# Patient Record
Sex: MALE | Race: WHITE | Employment: OTHER | URBAN - METROPOLITAN AREA
[De-identification: names, ages, dates, MRNs, and addresses within clinical notes are randomized per-mention and may not be internally consistent; named-entity substitution may affect disease eponyms.]

---

## 2017-09-27 ENCOUNTER — ALLIED HEALTH/NURSE VISIT (OUTPATIENT)
Dept: NURSING | Facility: CLINIC | Age: 32
End: 2017-09-27

## 2017-09-27 DIAGNOSIS — Z23 NEED FOR INFLUENZA VACCINATION: Primary | ICD-10-CM

## 2017-09-27 PROCEDURE — 90686 IIV4 VACC NO PRSV 0.5 ML IM: CPT

## 2017-09-27 PROCEDURE — 99207 ZZC NO CHARGE NURSE ONLY: CPT

## 2017-09-27 PROCEDURE — 90471 IMMUNIZATION ADMIN: CPT

## 2017-09-27 NOTE — NURSING NOTE
Screening Questionnaire for Adult Immunization    Are you sick today?   No   Do you have allergies to medications, food, a vaccine component or latex?   No   Have you ever had a serious reaction after receiving a vaccination?   No   Do you have a long-term health problem with heart disease, lung disease, asthma, kidney disease, metabolic disease (e.g. diabetes), anemia, or other blood disorder?   No   Do you have cancer, leukemia, HIV/AIDS, or any other immune system problem?   No   In the past 3 months, have you taken medications that affect  your immune system, such as prednisone, other steroids, or anticancer drugs; drugs for the treatment of rheumatoid arthritis, Crohn s disease, or psoriasis; or have you had radiation treatments?   No   Have you had a seizure, or a brain or other nervous system problem?   No   During the past year, have you received a transfusion of blood or blood     products, or been given immune (gamma) globulin or antiviral drug?   No   For women: Are you pregnant or is there a chance you could become        pregnant during the next month?   No   Have you received any vaccinations in the past 4 weeks?   No     Immunization questionnaire answers were all negative.        Per orders of Dr. Reed, injection of influenza given by Titi Hart. Patient instructed to remain in clinic for 15 minutes afterwards, and to report any adverse reaction to me immediately.       Screening performed by Titi Hart on 9/27/2017 at 12:06 PM.

## 2017-09-27 NOTE — MR AVS SNAPSHOT
"              After Visit Summary   9/27/2017    Jose Trammell    MRN: [id number]           Patient Information     Date Of Birth          1985        Visit Information        Provider Department      9/27/2017 1:15 PM UP NURSE [facility]        Today's Diagnoses     Need for influenza vaccination    -  1       Follow-ups after your visit        Your next 10 appointments already scheduled     Sep 27, 2017  1:15 PM CDT   Nurse Only with UP NURSE   [facility] ([facility])    [address]  [facility] [address] [phone]              Who to contact     If you have questions or need follow up information about today's clinic visit or your schedule please contact [facility] directly at [phone].  Normal or non-critical lab and imaging results will be communicated to you by MyChart, letter or phone within 4 business days after the clinic has received the results. If you do not hear from us within 7 days, please contact the clinic through MyChart or phone. If you have a critical or abnormal lab result, we will notify you by phone as soon as possible.  Submit refill requests through [employer] or call your pharmacy and they will forward the refill request to us. Please allow 3 business days for your refill to be completed.          Additional Information About Your Visit        MyChart Information     [employer] lets you send messages to your doctor, view your test results, renew your prescriptions, schedule appointments and more. To sign up, go to [email or web address] . Click on \"Log in\" on the left side of the screen, which will take you to the Welcome page. Then click on \"Sign up Now\" on the right side of the page.     You will be asked to enter the access code listed below, as well as some personal information. Please follow the directions to create your username and password.     Your access code is: [email or web address]  Expires: 12/26/2017 12:08 PM   "   Your access code will  in 90 days. If you need help or a new code, please call your [facility] clinic or [phone].        Care EveryWhere ID     This is your Care EveryWhere ID. This could be used by other organizations to access your [facility] medical records  [id number]         Blood Pressure from Last 3 Encounters:   16 112/60    Weight from Last 3 Encounters:   16 150 lb 11.2 oz (68.4 kg)              We Performed the Following     HC FLU VAC PRESRV FREE QUAD SPLIT VIR 3+YRS IM        Primary Care Provider    None Specified       No primary provider on file.        Equal Access to Services     [facility]: Hadii aad doe hadmariel Wick, magida kamila, romero kwonmabere chamberlain, michelle henson . So [facility] [phone].    ATENCIÓN: Si habla español, tiene a gomez disposición servicios gratuitos de asistencia lingüística. Llame al [phone].    We comply with applicable federal civil rights laws and [place] laws. We do not discriminate on the basis of race, color, national origin, age, disability sex, sexual orientation or gender identity.            Thank you!     Thank you for choosing [facility] N[facility]  for your care. Our goal is always to provide you with excellent care. Hearing back from our patients is one way we can continue to improve our services. Please take a few minutes to complete the written survey that you may receive in the mail after your visit with us. Thank you!             Your Updated Medication List - Protect others around you: Learn how to safely use, store and throw away your medicines at www.disposemymeds.org.      Notice  As of 2017 12:08 PM    You have not been prescribed any medications.

## 2020-08-04 ENCOUNTER — OFFICE VISIT (OUTPATIENT)
Dept: URGENT CARE | Facility: URGENT CARE | Age: 35
End: 2020-08-04
Payer: COMMERCIAL

## 2020-08-04 VITALS
HEART RATE: 88 BPM | TEMPERATURE: 98.6 F | OXYGEN SATURATION: 96 % | SYSTOLIC BLOOD PRESSURE: 118 MMHG | DIASTOLIC BLOOD PRESSURE: 80 MMHG | BODY MASS INDEX: 23.22 KG/M2 | WEIGHT: 157.7 LBS

## 2020-08-04 DIAGNOSIS — I49.9 IRREGULAR HEARTBEAT: Primary | ICD-10-CM

## 2020-08-04 DIAGNOSIS — Z20.828 EXPOSURE TO SARS-ASSOCIATED CORONAVIRUS: ICD-10-CM

## 2020-08-04 DIAGNOSIS — R00.2 PALPITATIONS: ICD-10-CM

## 2020-08-04 LAB
ANION GAP SERPL CALCULATED.3IONS-SCNC: 6 MMOL/L (ref 3–14)
BUN SERPL-MCNC: 12 MG/DL (ref 7–30)
CALCIUM SERPL-MCNC: 8.9 MG/DL (ref 8.5–10.1)
CHLORIDE SERPL-SCNC: 106 MMOL/L (ref 94–109)
CO2 SERPL-SCNC: 25 MMOL/L (ref 20–32)
CREAT SERPL-MCNC: 0.81 MG/DL (ref 0.66–1.25)
ERYTHROCYTE [DISTWIDTH] IN BLOOD BY AUTOMATED COUNT: 11.6 % (ref 10–15)
GFR SERPL CREATININE-BSD FRML MDRD: >90 ML/MIN/{1.73_M2}
GLUCOSE SERPL-MCNC: 97 MG/DL (ref 70–99)
HCT VFR BLD AUTO: 45.3 % (ref 40–53)
HGB BLD-MCNC: 15.7 G/DL (ref 13.3–17.7)
MCH RBC QN AUTO: 31.7 PG (ref 26.5–33)
MCHC RBC AUTO-ENTMCNC: 34.7 G/DL (ref 31.5–36.5)
MCV RBC AUTO: 91 FL (ref 78–100)
PLATELET # BLD AUTO: 266 10E9/L (ref 150–450)
POTASSIUM SERPL-SCNC: 4 MMOL/L (ref 3.4–5.3)
RBC # BLD AUTO: 4.96 10E12/L (ref 4.4–5.9)
SODIUM SERPL-SCNC: 137 MMOL/L (ref 133–144)
TSH SERPL DL<=0.005 MIU/L-ACNC: 1.16 MU/L (ref 0.4–4)
WBC # BLD AUTO: 10.4 10E9/L (ref 4–11)

## 2020-08-04 PROCEDURE — 99203 OFFICE O/P NEW LOW 30 MIN: CPT | Performed by: NURSE PRACTITIONER

## 2020-08-04 PROCEDURE — 99000 SPECIMEN HANDLING OFFICE-LAB: CPT | Performed by: NURSE PRACTITIONER

## 2020-08-04 PROCEDURE — 86769 SARS-COV-2 COVID-19 ANTIBODY: CPT | Mod: 90 | Performed by: NURSE PRACTITIONER

## 2020-08-04 PROCEDURE — 80048 BASIC METABOLIC PNL TOTAL CA: CPT | Performed by: NURSE PRACTITIONER

## 2020-08-04 PROCEDURE — 36415 COLL VENOUS BLD VENIPUNCTURE: CPT | Performed by: NURSE PRACTITIONER

## 2020-08-04 PROCEDURE — 93000 ELECTROCARDIOGRAM COMPLETE: CPT | Performed by: NURSE PRACTITIONER

## 2020-08-04 PROCEDURE — 85027 COMPLETE CBC AUTOMATED: CPT | Performed by: NURSE PRACTITIONER

## 2020-08-04 PROCEDURE — 84443 ASSAY THYROID STIM HORMONE: CPT | Performed by: NURSE PRACTITIONER

## 2020-08-04 SDOH — HEALTH STABILITY: MENTAL HEALTH: HOW OFTEN DO YOU HAVE A DRINK CONTAINING ALCOHOL?: 4 OR MORE TIMES A WEEK

## 2020-08-04 SDOH — HEALTH STABILITY: MENTAL HEALTH: HOW MANY STANDARD DRINKS CONTAINING ALCOHOL DO YOU HAVE ON A TYPICAL DAY?: 1 OR 2

## 2020-08-04 SDOH — HEALTH STABILITY: MENTAL HEALTH: HOW OFTEN DO YOU HAVE 6 OR MORE DRINKS ON ONE OCCASION?: MONTHLY

## 2020-08-04 NOTE — PROGRESS NOTES
SUBJECTIVE:   Jose Trammell is a 35 year old male presenting with a chief complaint of   Chief Complaint   Patient presents with     Irregular Heart Beat     on and off for a year or two. Pt thinks it seems to be related to alcohol and after being sobering and relaxing he feels his heart race. his HR will be at 120       35 year old male presents with the sensation of his heart racing when trying to get to sleep at night. Has had this happen before 4 year ago and was dx with anxiety. He denies any chest pain of SOB with these episodes. No history of heart disease or family hx of heart disease.   He checks his apple watch for EKG data and has not had any rates above 95 resting.   Does drink caffeine daily -1 cup of coffee and will socially drink alcohol on the weekends. Notices when he drinks too many cocktails, will feel worse. 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 Does not smoke. Feels stressed due to all this.     Review of Systems   REVIEW OF SYSTEMS  General: positive for as above, dizziness, fatigue, negative for fever, chills  Skin: negative  Eyes: negative  ENT: negative  Resp: No shortness of breath, dyspnea on exertion, cough, or hemoptysis  CV: negative for pain but positive for and palpitations  GI: negative  Neurologic: negative  Endocrine: negative    No past medical history on file.  No family history on file.  No current outpatient medications on file.     Social History     Tobacco Use     Smoking status: Never Smoker     Smokeless tobacco: Never Used   Substance Use Topics     Alcohol use: Yes     Alcohol/week: 0.0 standard drinks     Frequency: 4 or more times a week     Drinks per session: 1 or 2     Binge frequency: Monthly     Comment: can be hard liquor, wine, or beer       OBJECTIVE  /80   Pulse 88   Temp 98.6  F (37  C) (Tympanic)   Wt 71.5 kg (157 lb 11.2 oz)   SpO2 96%   BMI  23.22 kg/m      Physical Exam   EXAM:  Constitutional: healthy, alert and no distress   Cardiovascular: negative, PMI normal. No lifts, heaves, or thrills. RRR. No murmurs, clicks gallops or rub  Respiratory: negative, Percussion normal. Good diaphragmatic excursion. Lungs clear  Psychiatric: mentation appears normal and affect normal/bright  Neck: Neck supple. No adenopathy. Thyroid symmetric, normal size,, Carotids without bruits.  ENT: ENT exam normal, no neck nodes or sinus tenderness  Abdomen: Abdomen soft, non-tender. BS normal. No masses, organomegaly  NEURO: negative    Labs pending: BMP, CBC and TSH with reflex  EKG done NSR rate of 94. QTc 393. No T wave or ST segment abnormalities. Normal chen Garcia was seen today for irregular heart beat.    Diagnoses and all orders for this visit:    Irregular heartbeat  -     EKG 12-lead complete w/read - Clinics    Palpitations  -     CBC with platelets  -     Basic metabolic panel  (Ca, Cl, CO2, Creat, Gluc, K, Na, BUN)  -     TSH with free T4 reflex    Exposure to SARS-associated coronavirus  -     COVID-19 Virus (Coronavirus) Antibody & Titer Reflex; Future  -     COVID-19 Virus (Coronavirus) Antibody & Titer Reflex    Will have him follow up with FP/IM in 1-2 weeks for follow up from today's visit and establish care.. Go to the ER for chest pain or SOB.   Most likely symptoms due to stress and anxiety.

## 2020-08-04 NOTE — PATIENT INSTRUCTIONS
Patient Education     Exercise for a Healthier Heart     Exercise with a friend. When activity is fun, you're more likely to stick with it.   You may wonder how you can improve the health of your heart. If you re thinking about exercise, you re on the right track. You don t need to become an athlete, but you do need a certain amount of brisk exercise to help strengthen your heart. If you have been diagnosed with a heart condition, your doctor may recommend exercise to help stabilize your condition. To help make exercise a habit, choose safe, fun activities.  Be sure to check with your healthcare provider before starting an exercise program.  Why exercise?  Exercising regularly offers many healthy rewards. It can help you do all of the following:    Improve your blood cholesterol level to help prevent further heart trouble    Lower your blood pressure to help prevent a stroke or heart attack    Control diabetes, or reduce your risk of getting this disease    Improve your heart and lung function    Reach and maintain a healthy weight    Make your muscles stronger and more limber so you can stay active    Prevent falls and fractures by slowing the loss of bone mass (osteoporosis)    Manage stress better    Reduce your blood pressure    Improve your sense of self and your body image  Exercise tips  Ease into your routine. Set small goals. Then build on them.  Exercise on most days. Aim for a total of 150 or more minutes of moderate to  vigorous intensity activity each week. Consider 40 minutes, 3 to 4 times a week. For best results, activity should last for 40 minutes on average. It is OK to work up to the 40 minute period over time. Examples of moderate-intensity activity is walking 1 mile in 15 minutes or 30 to 45 minutes of yard work.  Step up your daily activity level. Along with your exercise program, try being more active throughout the day. Walk instead of drive. Do more household tasks or yard work.  Choose  one or more activities you enjoy. Walking is one of the easiest things you can do. You can also try swimming, riding a bike, dancing, or taking an exercise class.  Stop exercising and call your doctor if you:    Have chest pain or feel dizzy or lightheaded    Feel burning, tightness, pressure, or heaviness in your chest, neck, shoulders, back, or arms    Have unusual shortness of breath    Have increased joint or muscle pain    Have palpitations or an irregular heartbeat  Date Last Reviewed: 5/1/2016 2000-2019 [employer]. [address] [place] [id number]. All rights reserved. This information is not intended as a substitute for professional medical care. Always follow your healthcare professional's instructions.

## 2020-08-05 LAB
COVID-19 SPIKE RBD ABY TITER: NORMAL
COVID-19 SPIKE RBD ABY: NEGATIVE

## 2020-08-20 ENCOUNTER — OFFICE VISIT (OUTPATIENT)
Dept: FAMILY MEDICINE | Facility: CLINIC | Age: 35
End: 2020-08-20
Payer: COMMERCIAL

## 2020-08-20 VITALS
HEART RATE: 69 BPM | TEMPERATURE: 96.2 F | OXYGEN SATURATION: 98 % | DIASTOLIC BLOOD PRESSURE: 68 MMHG | WEIGHT: 158.6 LBS | SYSTOLIC BLOOD PRESSURE: 113 MMHG | BODY MASS INDEX: 23.49 KG/M2 | HEIGHT: 69 IN

## 2020-08-20 DIAGNOSIS — Z13.1 SCREENING FOR DIABETES MELLITUS: ICD-10-CM

## 2020-08-20 DIAGNOSIS — Z00.00 ROUTINE HISTORY AND PHYSICAL EXAMINATION OF ADULT: Primary | ICD-10-CM

## 2020-08-20 DIAGNOSIS — R00.2 PALPITATIONS: ICD-10-CM

## 2020-08-20 DIAGNOSIS — Z13.220 LIPID SCREENING: ICD-10-CM

## 2020-08-20 DIAGNOSIS — Z23 NEED FOR VACCINATION: ICD-10-CM

## 2020-08-20 LAB — HBA1C MFR BLD: 4.7 % (ref 0–5.6)

## 2020-08-20 PROCEDURE — 83036 HEMOGLOBIN GLYCOSYLATED A1C: CPT | Performed by: INTERNAL MEDICINE

## 2020-08-20 PROCEDURE — 90715 TDAP VACCINE 7 YRS/> IM: CPT | Performed by: INTERNAL MEDICINE

## 2020-08-20 PROCEDURE — 99395 PREV VISIT EST AGE 18-39: CPT | Mod: 25 | Performed by: INTERNAL MEDICINE

## 2020-08-20 PROCEDURE — 90471 IMMUNIZATION ADMIN: CPT | Performed by: INTERNAL MEDICINE

## 2020-08-20 PROCEDURE — 80061 LIPID PANEL: CPT | Performed by: INTERNAL MEDICINE

## 2020-08-20 PROCEDURE — 36415 COLL VENOUS BLD VENIPUNCTURE: CPT | Performed by: INTERNAL MEDICINE

## 2020-08-20 ASSESSMENT — MIFFLIN-ST. JEOR: SCORE: 1641.36

## 2020-08-20 NOTE — PROGRESS NOTES
SUBJECTIVE:   CC: Jose Trammell is an 35 year old male who presents for preventative health visit.     Healthy Habits:    Getting at least 3 servings of Calcium per day:  Yes    Bi-annual eye exam:  Yes    Dental care twice a year:  NO (once a year)    Sleep apnea or symptoms of sleep apnea:  Daytime drowsiness    Diet:  Regular (no restrictions)    Frequency of exercise:  2-3 days/week    Duration of exercise:  15-30 minutes    Taking medications regularly:  Not Applicable    Barriers to taking medications:  Not applicable    Medication side effects:  Not applicable    PHQ-2 Total Score:    Additional concerns today:  No      Today's PHQ-2 Score:   PHQ-2 ( 1999 Pfizer) 4/8/2016   Q1: Little interest or pleasure in doing things 0   Q2: Feeling down, depressed or hopeless 0   PHQ-2 Score 0       Abuse: Current or Past(Physical, Sexual or Emotional)- No  Do you feel safe in your environment? Yes        Social History     Tobacco Use     Smoking status: Never Smoker     Smokeless tobacco: Never Used   Substance Use Topics     Alcohol use: Yes     Alcohol/week: 0.0 standard drinks     Frequency: 4 or more times a week     Drinks per session: 1 or 2     Binge frequency: Monthly     Comment: can be hard liquor, wine, or beer     If you drink alcohol do you typically have >3 drinks per day or >7 drinks per week? No    Alcohol Use 8/20/2020   Prescreen: >3 drinks/day or >7 drinks/week? No       Last PSA: No results found for: PSA    Reviewed orders with patient. Reviewed health maintenance and updated orders accordingly - Yes  Lab work is in process    Reviewed and updated as needed this visit by clinical staff         Reviewed and updated as needed this visit by Provider        Past Medical History:   Diagnosis Date     Palpitations         Review of Systems  CONSTITUTIONAL: NEGATIVE for fever, chills, change in weight  INTEGUMENTARY/SKIN: NEGATIVE for worrisome rashes, moles or lesions  EYES: NEGATIVE for vision changes  "or irritation  ENT: NEGATIVE for ear, mouth and throat problems  RESP: NEGATIVE for significant cough or SOB  CV: POSITIVE for chest pain, positive for palpitations  GI: NEGATIVE for nausea, abdominal pain, heartburn, or change in bowel habits   male: negative for dysuria, hematuria, decreased urinary stream, erectile dysfunction, urethral discharge  MUSCULOSKELETAL: NEGATIVE for significant arthralgias or myalgia  NEURO: NEGATIVE for weakness, dizziness or paresthesias  PSYCHIATRIC: NEGATIVE for changes in mood or affect    OBJECTIVE:   /68 (BP Location: Left arm, Patient Position: Sitting, Cuff Size: Adult Regular)   Pulse 69   Temp 96.2  F (35.7  C) (Temporal)   Ht 1.755 m (5' 9.1\")   Wt 71.9 kg (158 lb 9.6 oz)   SpO2 98%   BMI 23.35 kg/m      Physical Exam  GENERAL: alert and no distress  EYES: Eyes grossly normal to inspection, PERRL and conjunctivae and sclerae normal  HENT: ear canals and TM's normal, nose and mouth without ulcers or lesions  NECK: no adenopathy, no asymmetry, masses, or scars and thyroid normal to palpation  RESP: lungs clear to auscultation - no rales, rhonchi or wheezes  CV: regular rate and rhythm, normal S1 S2, no S3 or S4, no murmur, click or rub, no peripheral edema and peripheral pulses strong  ABDOMEN: soft, nontender, no hepatosplenomegaly, no masses and bowel sounds normal  MS: no gross musculoskeletal defects noted, no edema  SKIN: no suspicious lesions or rashes  NEURO: Normal strength and tone, mentation intact and speech normal  PSYCH: mentation appears normal, affect normal/bright    Diagnostic Test Results:  Labs reviewed in Epic    ASSESSMENT/PLAN:   (Z00.00) Routine history and physical examination of adult  (primary encounter diagnosis)  (R00.2) Palpitations  Comment: recent palpitations symptoms of unclear etiology. No chest pains.  Plan: CARDIOLOGY EVAL ADULT REFERRAL      (Z13220) Lipid screening  Comment: patient is due for lipid screening  Plan: Lipid " "panel reflex to direct LDL Fasting      (Z13.1) Screening for diabetes mellitus  Comment: patient is due for A1c lab  Plan: Hemoglobin A1c      (Z23) Need for vaccination  Comment: patient is due for TDAP vaccine  Plan: TDAP VACCINE (ADACEL) [09712.002], 1st          Administration  [28929]        COUNSELING:   Reviewed preventive health counseling, as reflected in patient instructions  Special attention given to:        Regular exercise       Healthy diet/nutrition    Estimated body mass index is 23.22 kg/m  as calculated from the following:    Height as of 4/8/16: 1.755 m (5' 9.1\").    Weight as of 8/4/20: 71.5 kg (157 lb 11.2 oz).          reports that he has never smoked. He has never used smokeless tobacco.      Counseling Resources:  ATP IV Guidelines  Pooled Cohorts Equation Calculator  FRAX Risk Assessment  ICSI Preventive Guidelines  Dietary Guidelines for Americans, 2010  USDA's MyPlate  ASA Prophylaxis  Lung CA Screening    Siri Licea MD  [facility]  "

## 2020-08-20 NOTE — LETTER
August 26, 2020      Jose Trammell  [address]  [facility] [address]        Dear ,    We are writing to inform you of your test results.    Your labs look great except for very mild hyperlipidemia.  I would advise changes in diet and exercise. No change in medications. We will recheck labs in 1 year.       Resulted Orders   Hemoglobin A1c   Result Value Ref Range    Hemoglobin A1C 4.7 0 - 5.6 %      Comment:      Normal <5.7% Prediabetes 5.7-6.4%  Diabetes 6.5% or higher - adopted from ADA   consensus guidelines.     Lipid panel reflex to direct LDL Fasting   Result Value Ref Range    Cholesterol 182 <200 mg/dL    Triglycerides 75 <150 mg/dL      Comment:      Fasting specimen    HDL Cholesterol 46 >39 mg/dL    LDL Cholesterol Calculated 121 (H) <100 mg/dL      Comment:      Above desirable:  100-129 mg/dl  Borderline High:  130-159 mg/dL  High:             160-189 mg/dL  Very high:       >189 mg/dl      Non HDL Cholesterol 136 (H) <130 mg/dL      Comment:      Above Desirable:  130-159 mg/dl  Borderline high:  160-189 mg/dl  High:             190-219 mg/dl  Very high:       >219 mg/dl           If you have any questions or concerns, please call the clinic at the number listed above.       Sincerely,      Siri Licea MD

## 2020-08-21 LAB
CHOLEST SERPL-MCNC: 182 MG/DL
HDLC SERPL-MCNC: 46 MG/DL
LDLC SERPL CALC-MCNC: 121 MG/DL
NONHDLC SERPL-MCNC: 136 MG/DL
TRIGL SERPL-MCNC: 75 MG/DL

## 2020-08-24 ENCOUNTER — TELEPHONE (OUTPATIENT)
Dept: CARDIOLOGY | Facility: CLINIC | Age: 35
End: 2020-08-24

## 2020-08-24 NOTE — TELEPHONE ENCOUNTER
Wellness Screening Tool    Symptom Screening:    Do you have one of the following NEW symptoms:      Fever (subjective or >100.0)?  No    New cough? No    Shortness of breath? No    Chills? No    New loss of taste or smell? No    Generalized body aches? No    New persistent headache? No    New sore throat? No    Nausea, vomiting or diarrhea? No    Within the past 3 weeks, have you been exposed to someone with a known positive illness below?      COVID - 19 (known or suspected) No    Chicken pox?  No    Measles? No    Pertussis? No    Have you had a positive COVID-19 diagnostic test (nasal swab test) in the last 14 days or are you currently   on self-quarantine restrictions (i.e.travel restriction, exposure, etc?) No        Patient notified of visitor restriction: Yes  Patient informed to wear a mask: Yes    Patient's appointment status: Patient will be seen in clinic as scheduled on 8/25/20

## 2020-08-25 ENCOUNTER — OFFICE VISIT (OUTPATIENT)
Dept: CARDIOLOGY | Facility: CLINIC | Age: 35
End: 2020-08-25
Attending: INTERNAL MEDICINE
Payer: COMMERCIAL

## 2020-08-25 VITALS
SYSTOLIC BLOOD PRESSURE: 128 MMHG | WEIGHT: 156 LBS | BODY MASS INDEX: 23.11 KG/M2 | DIASTOLIC BLOOD PRESSURE: 80 MMHG | HEIGHT: 69 IN | HEART RATE: 98 BPM

## 2020-08-25 DIAGNOSIS — R00.2 PALPITATIONS: ICD-10-CM

## 2020-08-25 PROCEDURE — 99204 OFFICE O/P NEW MOD 45 MIN: CPT | Performed by: INTERNAL MEDICINE

## 2020-08-25 ASSESSMENT — MIFFLIN-ST. JEOR: SCORE: 1627.99

## 2020-08-25 NOTE — PROGRESS NOTES
"CARDIOLOGY CONSULT    REASON FOR CONSULT: palpitations     PRIMARY CARE PHYSICIAN:  Physician No Ref-Primary    HISTORY OF PRESENT ILLNESS:  Mr. Trammell is a 34 y/o gentleman without significant PMH who presents for the evaluation of palpitations.   Bakari states he has noted intermittent palpitations for \"years\", but they have increased in frequency for the past several months.  Bakari reports that he is under significant amount of stress.  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  He has a 1 and 3 year old children and worries about their isolation from friends and grandparents during the pandemic.  [place] [employer] [place] and subsequent unrest.    During this time, he states he has increased his alcohol intake to 2-3 glasses a day.  This will be a combination of hard alcohol drinks or wine.  He notes the palpitations are worse when drinking alcohol.  He notices them more at night at rest, not as much during the day.  He had one episode when he drank some alcohol and smoked a cigarette. He got up in the middle of the night to use the bathroom and felt suddenly very nauseated.  He says the next thing he knows he is on the floor.  He denies any other syncopal symptoms.  He does not get a lot of physical activity.  He an apple watch and documented his rhythm during some episodes:  Most documented normal sinus rhythm, others were inconclusive due to significant artifact.     PAST MEDICAL HISTORY:  Past Medical History:   Diagnosis Date     Palpitations        MEDICATIONS:  No current outpatient medications on file.     No current facility-administered medications for this visit.        ALLERGIES:  No Known Allergies    SOCIAL HISTORY:  Nonsmoker.  2-3 ETOH beverages a day    FAMILY HISTORY:  No pertinent family history    REVIEW OF SYSTEMS:  A complete ROS was obtained and the pertinent positives are outlined in the history of present illness above.  " The remainder of systems is negative.     PHYSICAL EXAM:                     Vital Signs with Ranges     0 lbs 0 oz    Constitutional: awake, alert, no distress  Eyes: PERRL, sclera nonicteric  ENT: trachea midline  Respiratory: CTAB  Cardiovascular: RRR on m/r/g,no JVD  GI: nondistended, nontender, bowel sounds present  Lymph/Hematologic: no lymphadenopathy  Skin: dry, no rash  Musculoskeletal: good muscle tone, strength 5/5 in upper and lower extremities  Neurologic: no focal deficits  Neuropsychiatric: appropriate affact    DATA:  Labs: Reviewed in EPIC  EKG: Dated 8/4/2020 reviewed personally.  Normal sinus rhythm without ST segment changes      ASSESSMENT:  1.  Palpitations  2.  Stress and anxiety    RECOMMENDATIONS:  1. I suspect his main issue is related to increased stress and anxiety given the numerous active stressors going on in his life.  We discussed the physical symptoms that can present with stress and anxiety.  To exclude any significant underlying cardiac issue, will obtain a 14 day zio patch monitor to exclude underlying arrhythmias and an echocardiogram to exclude underlying structural heart disease.  If these are unremarkable, I recommend he follow up with his primary MD for evaluation and management of anxiety.  We also discussed stress management including he importance of abstaining from alcohol, regular exercise etc.  He was in agreement.          Katie Blackburn MD  Cardiology - [facility]  Pager:  [phone]  August 25, 2020

## 2020-08-25 NOTE — LETTER
"8/25/2020    Siri Licea MD  [address] [facility] [address]  [facility] [address]    RE: Jose Trammell       Dear Colleague,    I had the pleasure of seeing Jose Trammell in the [facility].    CARDIOLOGY CONSULT    REASON FOR CONSULT: palpitations     PRIMARY CARE PHYSICIAN:  Physician No Ref-Primary    HISTORY OF PRESENT ILLNESS:  Mr. Trammell is a 36 y/o gentleman without significant PMH who presents for the evaluation of palpitations.   Bakari states he has noted intermittent palpitations for \"years\", but they have increased in frequency for the past several months.  Bakari reports that he is under significant amount of stress.  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  He has a 1 and 3 year old children and worries about their isolation from friends and grandparents during the pandemic.  [place] from [place] [employer] [place] and subsequent unrest.    During this time, he states he has increased his alcohol intake to 2-3 glasses a day.  This will be a combination of hard alcohol drinks or wine.  He notes the palpitations are worse when drinking alcohol.  He notices them more at night at rest, not as much during the day.  He had one episode when he drank some alcohol and smoked a cigarette. He got up in the middle of the night to use the bathroom and felt suddenly very nauseated.  He says the next thing he knows he is on the floor.  He denies any other syncopal symptoms.  He does not get a lot of physical activity.  He an apple watch and documented his rhythm during some episodes:  Most documented normal sinus rhythm, others were inconclusive due to significant artifact.     PAST MEDICAL HISTORY:  Past Medical History:   Diagnosis Date     Palpitations        MEDICATIONS:  No current outpatient medications on file.     No current facility-administered medications for this visit.        ALLERGIES:  No Known Allergies    SOCIAL " HISTORY:  Nonsmoker.  2-3 ETOH beverages a day    FAMILY HISTORY:  No pertinent family history    REVIEW OF SYSTEMS:  A complete ROS was obtained and the pertinent positives are outlined in the history of present illness above.  The remainder of systems is negative.     PHYSICAL EXAM:                     Vital Signs with Ranges     0 lbs 0 oz    Constitutional: awake, alert, no distress  Eyes: PERRL, sclera nonicteric  ENT: trachea midline  Respiratory: CTAB  Cardiovascular: RRR on m/r/g,no JVD  GI: nondistended, nontender, bowel sounds present  Lymph/Hematologic: no lymphadenopathy  Skin: dry, no rash  Musculoskeletal: good muscle tone, strength 5/5 in upper and lower extremities  Neurologic: no focal deficits  Neuropsychiatric: appropriate affact    DATA:  Labs: Reviewed in EPIC  EKG: Dated 8/4/2020 reviewed personally.  Normal sinus rhythm without ST segment changes      ASSESSMENT:  1.  Palpitations  2.  Stress and anxiety    RECOMMENDATIONS:  1. I suspect his main issue is related to increased stress and anxiety given the numerous active stressors going on in his life.  We discussed the physical symptoms that can present with stress and anxiety.  To exclude any significant underlying cardiac issue, will obtain a 14 day zio patch monitor to exclude underlying arrhythmias and an echocardiogram to exclude underlying structural heart disease.  If these are unremarkable, I recommend he follow up with his primary MD for evaluation and management of anxiety.  We also discussed stress management including he importance of abstaining from alcohol, regular exercise etc.  He was in agreement.          Katie Blackburn MD  Cardiology - [facility]  Pager:  [phone]  August 25, 2020    Thank you for allowing me to participate in the care of your patient.    Sincerely,     Katie Blackburn MD     [facility]

## 2020-08-31 ENCOUNTER — HOSPITAL ENCOUNTER (OUTPATIENT)
Dept: CARDIOLOGY | Facility: CLINIC | Age: 35
Discharge: HOME | End: 2020-08-31
Attending: INTERNAL MEDICINE | Admitting: INTERNAL MEDICINE
Payer: COMMERCIAL

## 2020-08-31 DIAGNOSIS — R00.2 PALPITATIONS: ICD-10-CM

## 2020-08-31 PROCEDURE — 93306 TTE W/DOPPLER COMPLETE: CPT

## 2020-08-31 PROCEDURE — 0296T LEADLESS EKG MONITOR 3 TO 14 DAYS: CPT

## 2020-08-31 PROCEDURE — 0298T LEADLESS EKG MONITOR 3 TO 14 DAYS: CPT | Performed by: INTERNAL MEDICINE

## 2020-08-31 PROCEDURE — 93306 TTE W/DOPPLER COMPLETE: CPT | Mod: 26 | Performed by: INTERNAL MEDICINE

## 2020-10-14 ENCOUNTER — TELEPHONE (OUTPATIENT)
Dept: CARDIOLOGY | Facility: CLINIC | Age: 35
End: 2020-10-14

## 2020-10-14 NOTE — TELEPHONE ENCOUNTER
Katie Blackburn MD  [facility] Team 4             Please let Mr. Wise know that his zio patch monitor did not demonstrate any significant irregular rhythms.  He has rare premature beats which may feel like a palpitation.  These were not frequent and no of clinical concern.  Overall, very reassuring.  He may follow up with cardiology on a PRN basis.   Katie Blackburn MD      Sent patient [email or web address] message with Dr. Blackburn's comments and results.

## 2020-12-27 ENCOUNTER — HEALTH MAINTENANCE LETTER (OUTPATIENT)
Age: 35
End: 2020-12-27

## 2021-10-09 ENCOUNTER — HEALTH MAINTENANCE LETTER (OUTPATIENT)
Age: 36
End: 2021-10-09

## 2021-12-21 ENCOUNTER — IMMUNIZATION (OUTPATIENT)
Dept: NURSING | Facility: CLINIC | Age: 36
End: 2021-12-21
Payer: COMMERCIAL

## 2021-12-21 PROCEDURE — 0064A COVID-19,PF,MODERNA (18+ YRS BOOSTER .25ML): CPT

## 2021-12-21 PROCEDURE — 91306 COVID-19,PF,MODERNA (18+ YRS BOOSTER .25ML): CPT

## 2022-05-18 ENCOUNTER — OFFICE VISIT (OUTPATIENT)
Dept: FAMILY MEDICINE | Facility: CLINIC | Age: 37
End: 2022-05-18
Payer: COMMERCIAL

## 2022-05-18 VITALS
WEIGHT: 157 LBS | DIASTOLIC BLOOD PRESSURE: 80 MMHG | SYSTOLIC BLOOD PRESSURE: 122 MMHG | HEIGHT: 69 IN | BODY MASS INDEX: 23.25 KG/M2 | HEART RATE: 60 BPM | TEMPERATURE: 97.7 F | OXYGEN SATURATION: 100 % | RESPIRATION RATE: 16 BRPM

## 2022-05-18 DIAGNOSIS — Z00.00 ROUTINE HISTORY AND PHYSICAL EXAMINATION OF ADULT: Primary | ICD-10-CM

## 2022-05-18 LAB
BASOPHILS # BLD AUTO: 0 10E3/UL (ref 0–0.2)
BASOPHILS NFR BLD AUTO: 0 %
EOSINOPHIL # BLD AUTO: 0 10E3/UL (ref 0–0.7)
EOSINOPHIL NFR BLD AUTO: 0 %
ERYTHROCYTE [DISTWIDTH] IN BLOOD BY AUTOMATED COUNT: 11.7 % (ref 10–15)
HBA1C MFR BLD: 4.7 % (ref 0–5.6)
HCT VFR BLD AUTO: 45.5 % (ref 40–53)
HGB BLD-MCNC: 15.7 G/DL (ref 13.3–17.7)
IMM GRANULOCYTES # BLD: 0 10E3/UL
IMM GRANULOCYTES NFR BLD: 0 %
LYMPHOCYTES # BLD AUTO: 2.6 10E3/UL (ref 0.8–5.3)
LYMPHOCYTES NFR BLD AUTO: 23 %
MCH RBC QN AUTO: 31.8 PG (ref 26.5–33)
MCHC RBC AUTO-ENTMCNC: 34.5 G/DL (ref 31.5–36.5)
MCV RBC AUTO: 92 FL (ref 78–100)
MONOCYTES # BLD AUTO: 0.9 10E3/UL (ref 0–1.3)
MONOCYTES NFR BLD AUTO: 8 %
NEUTROPHILS # BLD AUTO: 7.7 10E3/UL (ref 1.6–8.3)
NEUTROPHILS NFR BLD AUTO: 68 %
PLATELET # BLD AUTO: 320 10E3/UL (ref 150–450)
RBC # BLD AUTO: 4.94 10E6/UL (ref 4.4–5.9)
WBC # BLD AUTO: 11.2 10E3/UL (ref 4–11)

## 2022-05-18 PROCEDURE — 80061 LIPID PANEL: CPT | Performed by: INTERNAL MEDICINE

## 2022-05-18 PROCEDURE — 80048 BASIC METABOLIC PNL TOTAL CA: CPT | Performed by: INTERNAL MEDICINE

## 2022-05-18 PROCEDURE — 83036 HEMOGLOBIN GLYCOSYLATED A1C: CPT | Performed by: INTERNAL MEDICINE

## 2022-05-18 PROCEDURE — 86803 HEPATITIS C AB TEST: CPT | Performed by: INTERNAL MEDICINE

## 2022-05-18 PROCEDURE — 99395 PREV VISIT EST AGE 18-39: CPT | Performed by: INTERNAL MEDICINE

## 2022-05-18 PROCEDURE — 87389 HIV-1 AG W/HIV-1&-2 AB AG IA: CPT | Performed by: INTERNAL MEDICINE

## 2022-05-18 PROCEDURE — 85025 COMPLETE CBC W/AUTO DIFF WBC: CPT | Performed by: INTERNAL MEDICINE

## 2022-05-18 PROCEDURE — G0103 PSA SCREENING: HCPCS | Performed by: INTERNAL MEDICINE

## 2022-05-18 PROCEDURE — 36415 COLL VENOUS BLD VENIPUNCTURE: CPT | Performed by: INTERNAL MEDICINE

## 2022-05-18 PROCEDURE — 84443 ASSAY THYROID STIM HORMONE: CPT | Performed by: INTERNAL MEDICINE

## 2022-05-18 ASSESSMENT — ENCOUNTER SYMPTOMS
EYE PAIN: 0
SHORTNESS OF BREATH: 0
CONSTIPATION: 0
HEARTBURN: 0
DIZZINESS: 0
PALPITATIONS: 1
ARTHRALGIAS: 0
NAUSEA: 0
HEMATOCHEZIA: 0
WEAKNESS: 0
NERVOUS/ANXIOUS: 1
PARESTHESIAS: 0
MYALGIAS: 0
FREQUENCY: 0
DYSURIA: 0
JOINT SWELLING: 0
FEVER: 0
HEMATURIA: 0
HEADACHES: 0
DIARRHEA: 0
CHILLS: 0
ABDOMINAL PAIN: 0
COUGH: 0
SORE THROAT: 0

## 2022-05-18 ASSESSMENT — PAIN SCALES - GENERAL: PAINLEVEL: NO PAIN (0)

## 2022-05-18 NOTE — PROGRESS NOTES
SUBJECTIVE:   CC: Jose Trammell is an 37 year old male who presents for preventative health visit.       Patient has been advised of split billing requirements and indicates understanding: Yes  Healthy Habits:     Getting at least 3 servings of Calcium per day:  Yes    Bi-annual eye exam:  Yes    Dental care twice a year:  NO    Sleep apnea or symptoms of sleep apnea:  Daytime drowsiness    Diet:  Regular (no restrictions) and Breakfast skipped    Frequency of exercise:  1 day/week    Duration of exercise:  15-30 minutes    Taking medications regularly:  Yes    Medication side effects:  Not applicable    PHQ-2 Total Score: 1    Additional concerns today:  No    Ability to successfully perform activities of daily living: Yes, no assistance needed  Home safety:  none identified   Hearing impairment: none        Today's PHQ-2 Score:   PHQ-2 ( 1999 [employer]) 5/18/2022   Q1: Little interest or pleasure in doing things 0   Q2: Feeling down, depressed or hopeless 1   PHQ-2 Score 1   PHQ-2 Total Score (12-17 Years)- Positive if 3 or more points; Administer PHQ-A if positive -   Q1: Little interest or pleasure in doing things Not at all   Q2: Feeling down, depressed or hopeless Several days   PHQ-2 Score 1       Abuse: Current or Past(Physical, Sexual or Emotional)- No  Do you feel safe in your environment? Yes    Have you ever done Advance Care Planning? (For example, a Health Directive, POLST, or a discussion with a medical provider or your loved ones about your wishes): No, advance care planning information given to patient to review.  Patient plans to discuss their wishes with loved ones or provider.      Social History     Tobacco Use     Smoking status: Never Smoker     Smokeless tobacco: Never Used   Substance Use Topics     Alcohol use: Yes     Alcohol/week: 0.0 standard drinks     Comment: can be hard liquor, wine, or beer     If you drink alcohol do you typically have >3 drinks per day or >7 drinks per week?  No    Alcohol Use 5/18/2022   Prescreen: >3 drinks/day or >7 drinks/week? Yes   Prescreen: >3 drinks/day or >7 drinks/week? -   AUDIT SCORE  5     AUDIT - Alcohol Use Disorders Identification Test - Reproduced from the World Health Organization Audit 2001 (Second Edition) 5/18/2022   1.  How often do you have a drink containing alcohol? 2 to 3 times a week   2.  How many drinks containing alcohol do you have on a typical day when you are drinking? 3 or 4   3.  How often do you have five or more drinks on one occasion? Never   4.  How often during the last year have you found that you were not able to stop drinking once you had started? Never   5.  How often during the last year have you failed to do what was normally expected of you because of drinking? Never   6.  How often during the last year have you needed a first drink in the morning to get yourself going after a heavy drinking session? Never   7.  How often during the last year have you had a feeling of guilt or remorse after drinking? Less than monthly   8.  How often during the last year have you been unable to remember what happened the night before because of your drinking? Never   9.  Have you or someone else been injured because of your drinking? No   10. Has a relative, friend, doctor or other health care worker been concerned about your drinking or suggested you cut down? No   TOTAL SCORE 5       Last PSA: No results found for: PSA    Reviewed orders with patient. Reviewed health maintenance and updated orders accordingly - Yes  Labs reviewed in EPIC    Reviewed and updated as needed this visit by clinical staff   Tobacco  Allergies  Meds                Reviewed and updated as needed this visit by Provider                   Past Medical History:   Diagnosis Date     Palpitations         Review of Systems  CONSTITUTIONAL: NEGATIVE for fever, chills, change in weight  INTEGUMENTARY/SKIN: NEGATIVE for worrisome rashes, moles or lesions  EYES: NEGATIVE  "for vision changes or irritation  ENT: NEGATIVE for ear, mouth and throat problems  RESP: NEGATIVE for significant cough or SOB  CV: NEGATIVE for chest pain, palpitations or peripheral edema  GI: NEGATIVE for nausea, abdominal pain, heartburn, or change in bowel habits   male: negative for dysuria, hematuria, decreased urinary stream, erectile dysfunction, urethral discharge  MUSCULOSKELETAL: NEGATIVE for significant arthralgias or myalgia  NEURO: NEGATIVE for weakness, dizziness or paresthesias  PSYCHIATRIC: NEGATIVE for changes in mood or affect    OBJECTIVE:   /80 (BP Location: Left arm, Patient Position: Sitting, Cuff Size: Adult Large)   Pulse 60   Temp 97.7  F (36.5  C) (Temporal)   Resp 16   Ht 1.753 m (5' 9\")   Wt 71.2 kg (157 lb)   SpO2 100%   BMI 23.18 kg/m      Physical Exam  GENERAL: alert and no distress  EYES: Eyes grossly normal to inspection, PERRL and conjunctivae and sclerae normal  HENT: ear canals and TM's normal, nose and mouth without ulcers or lesions  NECK: no adenopathy, no asymmetry, masses, or scars and thyroid normal to palpation  RESP: lungs clear to auscultation - no rales, rhonchi or wheezes  CV: regular rate and rhythm  ABDOMEN: soft, nontender, no hepatosplenomegaly, no masses and bowel sounds normal  MS: no gross musculoskeletal defects noted, no edema  SKIN: no suspicious lesions or rashes  NEURO: Normal strength and tone, mentation intact and speech normal  PSYCH: mentation appears normal, affect normal/bright    Diagnostic Test Results:  Labs reviewed in Epic    ASSESSMENT/PLAN:   Jose was seen today for physical.    Diagnoses and all orders for this visit:    Routine history and physical examination of adult  -     TSH with free T4 reflex; Future  -     Basic metabolic panel  (Ca, Cl, CO2, Creat, Gluc, K, Na, BUN); Future  -     CBC with platelets and differential; Future  -     Hemoglobin A1c; Future  -     Lipid panel reflex to direct LDL Fasting; Future  -   " "  PSA, screen; Future  -     HIV Antigen Antibody Combo; Future  -     Hepatitis C antibody; Future        Patient has been advised of split billing requirements and indicates understanding: Yes    COUNSELING:   Reviewed preventive health counseling, as reflected in patient instructions  Special attention given to:        Regular exercise       Healthy diet/nutrition    Estimated body mass index is 23.18 kg/m  as calculated from the following:    Height as of this encounter: 1.753 m (5' 9\").    Weight as of this encounter: 71.2 kg (157 lb).         He reports that he has never smoked. He has never used smokeless tobacco.      Counseling Resources:  ATP IV Guidelines  Pooled Cohorts Equation Calculator  FRAX Risk Assessment  ICSI Preventive Guidelines  Dietary Guidelines for Americans, 2010  USDA's MyPlate  ASA Prophylaxis  Lung CA Screening    Siri Licea MD  [facility]  "

## 2022-05-19 LAB
HCV AB SERPL QL IA: NONREACTIVE
HIV 1+2 AB+HIV1 P24 AG SERPL QL IA: NONREACTIVE

## 2022-05-20 LAB
ANION GAP SERPL CALCULATED.3IONS-SCNC: 7 MMOL/L (ref 3–14)
BUN SERPL-MCNC: 11 MG/DL (ref 7–30)
CALCIUM SERPL-MCNC: 9.3 MG/DL (ref 8.5–10.1)
CHLORIDE BLD-SCNC: 105 MMOL/L (ref 94–109)
CHOLEST SERPL-MCNC: 192 MG/DL
CO2 SERPL-SCNC: 26 MMOL/L (ref 20–32)
CREAT SERPL-MCNC: 0.78 MG/DL (ref 0.66–1.25)
FASTING STATUS PATIENT QL REPORTED: YES
GFR SERPL CREATININE-BSD FRML MDRD: >90 ML/MIN/1.73M2
GLUCOSE BLD-MCNC: 88 MG/DL (ref 70–99)
HDLC SERPL-MCNC: 67 MG/DL
LDLC SERPL CALC-MCNC: 105 MG/DL
NONHDLC SERPL-MCNC: 125 MG/DL
POTASSIUM BLD-SCNC: 3.8 MMOL/L (ref 3.4–5.3)
PSA SERPL-MCNC: 0.39 UG/L (ref 0–4)
SODIUM SERPL-SCNC: 138 MMOL/L (ref 133–144)
TRIGL SERPL-MCNC: 98 MG/DL
TSH SERPL DL<=0.005 MIU/L-ACNC: 1.21 MU/L (ref 0.4–4)

## 2022-09-11 ENCOUNTER — HEALTH MAINTENANCE LETTER (OUTPATIENT)
Age: 37
End: 2022-09-11

## 2023-04-08 NOTE — PROGRESS NOTES
Prior to immunization administration, verified patients identity using patient s name and date of birth. Please see Immunization Activity for additional information.     Screening Questionnaire for Adult Immunization    Are you sick today?   No   Do you have allergies to medications, food, a vaccine component or latex?   No   Have you ever had a serious reaction after receiving a vaccination?   No   Do you have a long-term health problem with heart, lung, kidney, or metabolic disease (e.g., diabetes), asthma, a blood disorder, no spleen, complement component deficiency, a cochlear implant, or a spinal fluid leak?  Are you on long-term aspirin therapy?   No   Do you have cancer, leukemia, HIV/AIDS, or any other immune system problem?   No   Do you have a parent, brother, or sister with an immune system problem?   No   In the past 3 months, have you taken medications that affect  your immune system, such as prednisone, other steroids, or anticancer drugs; drugs for the treatment of rheumatoid arthritis, Crohn s disease, or psoriasis; or have you had radiation treatments?   No   Have you had a seizure, or a brain or other nervous system problem?   No   During the past year, have you received a transfusion of blood or blood    products, or been given immune (gamma) globulin or antiviral drug?   No   For women: Are you pregnant or is there a chance you could become       pregnant during the next month?   No   Have you received any vaccinations in the past 4 weeks?   No     Immunization questionnaire answers were all negative.        Per orders of Dr. Licea, injection of Adacel (TDaP) given by Lauri Blake CMA. Patient instructed to remain in clinic for 15 minutes afterwards, and to report any adverse reaction to me immediately.       Screening performed by Lauri Blake CMA on 8/20/2020 at 2:17 PM.     today

## 2023-07-29 ENCOUNTER — HEALTH MAINTENANCE LETTER (OUTPATIENT)
Age: 38
End: 2023-07-29

## 2025-05-17 ENCOUNTER — HEALTH MAINTENANCE LETTER (OUTPATIENT)
Age: 40
End: 2025-05-17